# Patient Record
Sex: MALE | Race: BLACK OR AFRICAN AMERICAN | NOT HISPANIC OR LATINO | Employment: OTHER | URBAN - METROPOLITAN AREA
[De-identification: names, ages, dates, MRNs, and addresses within clinical notes are randomized per-mention and may not be internally consistent; named-entity substitution may affect disease eponyms.]

---

## 2017-02-21 ENCOUNTER — HOSPITAL ENCOUNTER (EMERGENCY)
Facility: HOSPITAL | Age: 47
Discharge: HOME | End: 2017-02-21
Attending: FAMILY MEDICINE
Payer: MEDICAID

## 2017-02-21 VITALS
BODY MASS INDEX: 35.42 KG/M2 | RESPIRATION RATE: 20 BRPM | SYSTOLIC BLOOD PRESSURE: 130 MMHG | TEMPERATURE: 98 F | WEIGHT: 300 LBS | HEART RATE: 63 BPM | DIASTOLIC BLOOD PRESSURE: 77 MMHG | OXYGEN SATURATION: 99 % | HEIGHT: 77 IN
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
## 2017-02-21 DIAGNOSIS — M23.92 INTERNAL DERANGEMENT OF KNEE, LEFT: Primary | ICD-10-CM

## 2017-02-21 PROCEDURE — 99283 EMERGENCY DEPT VISIT LOW MDM: CPT

## 2017-02-21 RX ORDER — TRAMADOL HYDROCHLORIDE 50 MG/1
50 TABLET ORAL EVERY 6 HOURS PRN
Qty: 20 TABLET | Refills: 0 | Status: SHIPPED | OUTPATIENT
Start: 2017-02-21 | End: 2017-03-03

## 2017-02-21 NOTE — ED PROVIDER NOTES
"Encounter Date: 2/21/2017       History     Chief Complaint   Patient presents with    Knee Pain     PT reports left knee pain. Pt reports he was stepping out of his truck and missed a step.     Review of patient's allergies indicates:  No Known Allergies  HPI Comments: Patient's 46-year-old black male comes in complaining of pain and tenderness to his left knee stemming from an injury this morning.  Patient was stepping out of his work truck on a rainy day when he slipped and twisted his knee and struck the anterior knee on the running board of the vehicle and on the ground.  He's had no locking or giving way.  He states the pain is more infrapatellar and anterior medial joint line.  The patient's been told by his orthopedist back in [place] that he had  "arthritis" .  Patient has recently moved to this area    The history is provided by the patient.     Past Medical History   Diagnosis Date    Arthritis      No past medical history pertinent negatives.  History reviewed. No pertinent past surgical history.  History reviewed. No pertinent family history.  Social History   Substance Use Topics    Smoking status: Never Smoker    Smokeless tobacco: None    Alcohol use No     Review of Systems   Constitutional: Negative for fever.   Respiratory: Negative for shortness of breath.    Musculoskeletal: Positive for arthralgias, gait problem and joint swelling. Negative for neck pain.   Neurological: Negative for weakness and numbness.   All other systems reviewed and are negative.      Physical Exam   Initial Vitals   BP Pulse Resp Temp SpO2   02/21/17 1358 02/21/17 1358 02/21/17 1358 02/21/17 1358 02/21/17 1358   136/79 68 20 98.4 °F (36.9 °C) 98 %     Physical Exam    Nursing note and vitals reviewed.  Constitutional: He appears well-developed and well-nourished.   Eyes: EOM are normal. Pupils are equal, round, and reactive to light.   Neck: Normal range of motion. Neck supple.   Cardiovascular: Normal rate and " regular rhythm.   Pulmonary/Chest: No respiratory distress.   Abdominal: Soft. There is no tenderness.   Musculoskeletal: He exhibits tenderness.   Patient lacks last 510° of full extension of the left knee.  Lachman and drawer test are negative.  No click on Chung test.  No medial or lateral instability when compared with the right knee.  He does complain of some tenderness on the anterior medial joint line   Neurological: He is alert and oriented to person, place, and time. He has normal strength. No cranial nerve deficit or sensory deficit.   Skin: Skin is warm. No erythema.   Psychiatric: He has a normal mood and affect.         ED Course   Procedures  Labs Reviewed - No data to display          Medical Decision Making:   Clinical Tests:   Radiological Study: Ordered and Reviewed  ED Management:  Plain films of the are negative.  Advised patient this does not rule out a meniscus or ligament injury although I cannot tell from his physical exam.  We'll apply Ace bandage.  Advised to use ice (gave him an ice bag).  And gave him a short prescription of tramadol to take along with Tylenol for pain relief.  Gave him the name of local orthopedist to make an appointment with as soon as possible                   ED Course     Clinical Impression:   The primary encounter diagnosis was Internal derangement of knee, left. A diagnosis of Fall was also pertinent to this visit.          ARABELLA Cr MD  02/21/17 9003

## 2017-02-21 NOTE — ED AVS SNAPSHOT
[facility]  [address] [place] [address]               Italia Frank   2017  1:57 PM   ED    Description:  Male : 1970   Department:  [facility]           Your Care was Coordinated By:     Provider Role From Javon Cr MD Attending Provider 17 [id number] --      Reason for Visit     Knee Pain           Diagnoses this Visit        Comments    Internal derangement of knee, left    -  Primary     Fall           ED Disposition     ED Disposition Condition Comment    Discharge             To Do List           Follow-up Information     Schedule an appointment as soon as possible for a visit with Cholo Whittaker MD.    Specialty:  Orthopedic Surgery    Contact information:    [address]  [address]  [place] [address]  [phone]         These Medications        Disp Refills Start End    tramadol (ULTRAM) 50 mg tablet 20 tablet 0 2017 3/3/2017    Take 1 tablet (50 mg total) by mouth every 6 (six) hours as needed for Pain. With a tylenol prn pain - Oral      [facility]     [facility] Care [facility] -  Assistance  Registered nurses in the [facility] provide clinical advisement, health education, appointment booking, and other advisory services.  Call for this free service at [phone].             Medications           Message regarding Medications     Verify the changes and/or additions to your medication regime listed below are the same as discussed with your clinician today.  If any of these changes or additions are incorrect, please notify your healthcare provider.        START taking these NEW medications        Refills    tramadol (ULTRAM) 50 mg tablet 0    Sig: Take 1 tablet (50 mg total) by mouth every 6 (six) hours as needed for Pain. With a tylenol prn pain    Class: Print    Route: Oral           Verify that the below list of medications is an accurate representation of  "the medications you are currently taking.  If none reported, the list may be blank. If incorrect, please contact your healthcare provider. Carry this list with you in case of emergency.           Current Medications     tramadol (ULTRAM) 50 mg tablet Take 1 tablet (50 mg total) by mouth every 6 (six) hours as needed for Pain. With a tylenol prn pain           Clinical Reference Information           Your Vitals Were     BP Pulse Temp Resp Height Weight    136/79 (BP Location: Right arm, Patient Position: Sitting) 68 98.4 °F (36.9 °C) (Oral) 20 6' 5" (1.956 m) 136.1 kg (300 lb)    SpO2 BMI             98% 35.57 kg/m2         Allergies as of 2/21/2017     No Known Allergies      Immunizations Administered on Date of Encounter - 2/21/2017     None      ED Micro, Lab, POCT     None      ED Imaging Orders     Start Ordered       Status Ordering Provider    02/21/17 1416 02/21/17 1415  X-Ray Knee 3 View Left  1 time imaging      Final result       Discharge References/Attachments     KNEE PAIN OF UNCERTAIN CAUSE (ENGLISH)    KNEE PAIN WITH POSSIBLE TORN MENISCUS (ENGLISH)      [facility] Sign-Up     Activating your [facility] account is as easy as 1-2-3!     1) Visit [email or web address], select Sign Up Now, enter this activation code and your date of birth, then select Next.  [email or web address]  Expires: 4/7/2017  3:06 PM      2) Create a username and password to use when you visit [facility] in the future and select a security question in case you lose your password and select Next.    3) Enter your e-mail address and click Sign Up!    Additional Information  If you have questions, please e-mail [email or web address] or call [phone] to talk to our [facility] staff. Remember, [facility] is NOT to be used for urgent needs. For medical emergencies, dial 911.          [facility] complies with applicable Federal civil rights laws and does not discriminate on the basis of race, color, national origin, age, " disability, or sex.        Language Assistance Services     ATTENTION: Language assistance services are available, free of charge. Please call [phone].      ATENCIÓN: Si habla akankshaañol, tiene a bowser disposición servicios gratuitos de asistencia lingüística. Llame al [phone].     CHÚ Ý: N?u b?n nói Ti?ng Vi?t, có các d?ch v? h? tr? ngôn ng? mi?n phí dành cho b?n. G?i s? [phone].

## 2017-07-08 ENCOUNTER — HOSPITAL ENCOUNTER (EMERGENCY)
Facility: HOSPITAL | Age: 47
Discharge: HOME | End: 2017-07-08
Attending: EMERGENCY MEDICINE
Payer: MEDICARE

## 2017-07-08 VITALS
DIASTOLIC BLOOD PRESSURE: 59 MMHG | TEMPERATURE: 99 F | SYSTOLIC BLOOD PRESSURE: 125 MMHG | HEIGHT: 77 IN | HEART RATE: 72 BPM | RESPIRATION RATE: 20 BRPM | BODY MASS INDEX: 35.42 KG/M2 | WEIGHT: 300 LBS | OXYGEN SATURATION: 98 %

## 2017-07-08 DIAGNOSIS — M54.9 BACK PAIN: ICD-10-CM

## 2017-07-08 DIAGNOSIS — M25.551 RIGHT HIP PAIN: ICD-10-CM

## 2017-07-08 DIAGNOSIS — M25.569 KNEE PAIN, ACUTE: ICD-10-CM

## 2017-07-08 PROCEDURE — 99284 EMERGENCY DEPT VISIT MOD MDM: CPT

## 2017-07-08 PROCEDURE — 25000003 PHARM REV CODE 250: Performed by: EMERGENCY MEDICINE

## 2017-07-08 RX ORDER — HYDROCODONE BITARTRATE AND ACETAMINOPHEN 5; 325 MG/1; MG/1
1 TABLET ORAL
Status: COMPLETED | OUTPATIENT
Start: 2017-07-08 | End: 2017-07-08

## 2017-07-08 RX ORDER — CYCLOBENZAPRINE HCL 10 MG
10 TABLET ORAL 3 TIMES DAILY PRN
Qty: 15 TABLET | Refills: 0 | Status: SHIPPED | OUTPATIENT
Start: 2017-07-08 | End: 2017-07-13

## 2017-07-08 RX ORDER — DOCUSATE SODIUM 100 MG/1
100 CAPSULE, LIQUID FILLED ORAL 2 TIMES DAILY PRN
Qty: 30 CAPSULE | Refills: 0 | Status: SHIPPED | OUTPATIENT
Start: 2017-07-08 | End: 2017-08-05

## 2017-07-08 RX ORDER — HYDROCODONE BITARTRATE AND ACETAMINOPHEN 5; 325 MG/1; MG/1
1 TABLET ORAL EVERY 4 HOURS PRN
Qty: 18 TABLET | Refills: 0 | Status: SHIPPED | OUTPATIENT
Start: 2017-07-08 | End: 2018-06-18

## 2017-07-08 RX ADMIN — HYDROCODONE BITARTRATE AND ACETAMINOPHEN 1 TABLET: 5; 325 TABLET ORAL at 10:07

## 2017-07-09 NOTE — ED PROVIDER NOTES
Encounter Date: 7/8/2017       History     Chief Complaint   Patient presents with    Fall     Slip and fall on ice, did not hit head, no LOC, c/o right lower back and left knee pain.  No other c/o.     Pt comes to the ED with right lower back and left knee pain after slipping on ice and falling just pta. No LOC           Review of patient's allergies indicates:  No Known Allergies  Past Medical History:   Diagnosis Date    Arthritis     Hypertension      Past Surgical History:   Procedure Laterality Date    HAND SURGERY       History reviewed. No pertinent family history.  Social History   Substance Use Topics    Smoking status: Never Smoker    Smokeless tobacco: Never Used    Alcohol use No     Review of Systems   Constitutional: Negative for fever.   HENT: Negative for sore throat.    Respiratory: Negative for shortness of breath.    Cardiovascular: Negative for chest pain.   Gastrointestinal: Negative for nausea.   Genitourinary: Negative for dysuria.   Musculoskeletal: Positive for arthralgias and back pain.   Skin: Negative for rash.   Neurological: Negative for weakness.   Hematological: Does not bruise/bleed easily.   All other systems reviewed and are negative.      Physical Exam     Initial Vitals [07/08/17 2157]   BP Pulse Resp Temp SpO2   (!) 125/59 72 20 98.9 °F (37.2 °C) 98 %      MAP       81         Physical Exam    Nursing note and vitals reviewed.  Constitutional: Vital signs are normal. He appears well-developed and well-nourished. No distress.   HENT:   Head: Normocephalic and atraumatic.   Eyes: EOM are normal. Pupils are equal, round, and reactive to light.   Neck: Normal range of motion. Neck supple.   Cardiovascular: Normal rate, regular rhythm and normal heart sounds.   Pulmonary/Chest: Breath sounds normal. No respiratory distress. He has no wheezes. He has no rhonchi. He has no rales. He exhibits no tenderness.   Abdominal: Soft. He exhibits no distension. There is no tenderness.  There is no rebound and no guarding.   Musculoskeletal: Normal range of motion. He exhibits no tenderness.   Neurological: He is alert and oriented to person, place, and time. No cranial nerve deficit.   Skin: Skin is warm and dry.   Psychiatric: He has a normal mood and affect.         ED Course   Procedures  Labs Reviewed - No data to display                            ED Course     Clinical Impression:   Diagnoses of Back pain, Knee pain, acute, and Right hip pain were pertinent to this visit.    Disposition:   Disposition: Discharged                        Saw Singer MD  07/19/17 4673

## 2017-07-09 NOTE — ED NOTES
Arrived via [place] from [facility] s/p fall with c/o RT lower back and coccyx pain and LT knee pain. Denies LOC

## 2017-08-05 ENCOUNTER — HOSPITAL ENCOUNTER (EMERGENCY)
Facility: HOSPITAL | Age: 47
Discharge: HOME | End: 2017-08-05
Attending: EMERGENCY MEDICINE
Payer: MEDICARE

## 2017-08-05 VITALS
OXYGEN SATURATION: 97 % | WEIGHT: 280 LBS | RESPIRATION RATE: 16 BRPM | HEART RATE: 70 BPM | BODY MASS INDEX: 33.06 KG/M2 | DIASTOLIC BLOOD PRESSURE: 69 MMHG | TEMPERATURE: 98 F | SYSTOLIC BLOOD PRESSURE: 141 MMHG | HEIGHT: 77 IN

## 2017-08-05 DIAGNOSIS — M19.032 ARTHRITIS OF LEFT WRIST: ICD-10-CM

## 2017-08-05 DIAGNOSIS — M54.50 LUMBOSACRAL PAIN, CHRONIC: Primary | ICD-10-CM
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
## 2017-08-05 DIAGNOSIS — G89.29 LUMBOSACRAL PAIN, CHRONIC: Primary | ICD-10-CM

## 2017-08-05 DIAGNOSIS — T14.90XA INJURY: ICD-10-CM

## 2017-08-05 PROCEDURE — 99283 EMERGENCY DEPT VISIT LOW MDM: CPT

## 2017-08-05 PROCEDURE — 63600175 PHARM REV CODE 636 W HCPCS: Performed by: EMERGENCY MEDICINE

## 2017-08-05 RX ORDER — PREDNISONE 10 MG/1
10 TABLET ORAL DAILY
Qty: 21 TABLET | Refills: 0 | Status: SHIPPED | OUTPATIENT
Start: 2017-08-05 | End: 2017-08-15

## 2017-08-05 RX ORDER — HYDROCODONE BITARTRATE AND ACETAMINOPHEN 7.5; 325 MG/1; MG/1
1 TABLET ORAL EVERY 6 HOURS PRN
Qty: 12 TABLET | Refills: 0 | Status: SHIPPED | OUTPATIENT
Start: 2017-08-05 | End: 2018-06-18

## 2017-08-05 RX ORDER — PREDNISONE 20 MG/1
60 TABLET ORAL
Status: COMPLETED | OUTPATIENT
Start: 2017-08-05 | End: 2017-08-05

## 2017-08-05 RX ADMIN — PREDNISONE 60 MG: 20 TABLET ORAL at 02:08

## 2017-08-05 NOTE — ED PROVIDER NOTES
Encounter Date: 8/5/2017       History     Chief Complaint   Patient presents with    Back Pain     Lower back pain since July 8- injured back in a fall at that time, states he is having continued lumbar back pain with radiation down legs.      Well-developed 47-year-old female.  No acute distress at this time are stable presently.  Complains of pain in the lumbar region as well as left wrist.  Patient reports in a fall on July 8.  Was seen by emergency room and evaluated in the time.  States that he is still in pain since that time.  Primarily in the low back.  Also has mild swelling of the soft tissues of the left wrist.  Comes in for further evaluation.  Denies any dysuria.  No difficulty walking.  No pain radiating down his legs bilateral.          Review of patient's allergies indicates:  No Known Allergies  Past Medical History:   Diagnosis Date    Arthritis     Hypertension      Past Surgical History:   Procedure Laterality Date    HAND SURGERY       History reviewed. No pertinent family history.  Social History   Substance Use Topics    Smoking status: Never Smoker    Smokeless tobacco: Never Used    Alcohol use No     Review of Systems   Constitutional: Negative.    Eyes: Negative.    Respiratory: Negative.    Cardiovascular: Negative.    Gastrointestinal: Negative.    Musculoskeletal: Positive for arthralgias ( Patient with pain in his left wrist secondary to fall) and back pain ( Chronic Back low back pain).   Skin: Negative.    All other systems reviewed and are negative.      Physical Exam     Initial Vitals [08/05/17 0157]   BP Pulse Resp Temp SpO2   (!) 149/92 80 18 98 °F (36.7 °C) 98 %      MAP       111         Physical Exam    Nursing note and vitals reviewed.  Constitutional: He appears well-developed and well-nourished.   HENT:   Head: Normocephalic.   Eyes: Pupils are equal, round, and reactive to light.   Neck: Normal range of motion. Neck supple.   Cardiovascular: Normal rate, regular  rhythm and normal heart sounds.   Pulmonary/Chest: Breath sounds normal.   Abdominal: Soft.   Musculoskeletal:   Tenderness to palpation lumbar region paraspinal.  Mild tenderness palpation left wrist on soft tissue mass.  Full range of motion.  No sciatica noted.  No midline tenderness to palpation of back.   Neurological: He is alert and oriented to person, place, and time. He has normal strength and normal reflexes.   Psychiatric: He has a normal mood and affect. His behavior is normal. Thought content normal.         ED Course   Procedures  Labs Reviewed - No data to display       X-Rays:   Independently Interpreted Readings:   Other Readings:  Lumbar spines and left wrist demonstrate no acute findings.  There is obvious DJD of the patient's L5-S1 and L4-L5 joint spaces.  There is also advanced arthritic changes of the patient's distal radius and in some of the carpals of the wrist.  All these appear to be chronic in nature.  No acute findings per my read.  Lumbar spine consistent with an x-ray performed previously.    Medical Decision Making:   Differential Diagnosis:   Kidney stone, muscle strain, epidural hematoma, epidural abscess, abscess superficial, fracture, facet dislocation, sciatica, radiculopathy, shingles, aortic aneurysm, renal artery aneurysm, contusion, trauma.    ED Management:  The patient stable at this time.  I will treat this patient with some steroid therapy, low-dose pain medicine.  Follow-up primary care physician as necessary.                   ED Course     Clinical Impression:   The primary encounter diagnosis was Lumbosacral pain, chronic. Diagnoses of Injury, Fall, and Arthritis of left wrist were also pertinent to this visit.    Disposition:   Disposition: Discharged  Condition: Stable                        Rock Mays MD  08/05/17 1212

## 2017-08-05 NOTE — DISCHARGE INSTRUCTIONS
Follow-up with your primary care physician for further evaluation of her chronic lumbar sacral pain as well as the left with arthritis.  Return to the emergency room with any concerns.

## 2017-12-18 ENCOUNTER — HOSPITAL ENCOUNTER (EMERGENCY)
Facility: HOSPITAL | Age: 47
Discharge: HOME | End: 2017-12-18
Attending: FAMILY MEDICINE
Payer: MEDICARE

## 2017-12-18 VITALS
HEART RATE: 57 BPM | TEMPERATURE: 97 F | OXYGEN SATURATION: 98 % | DIASTOLIC BLOOD PRESSURE: 68 MMHG | SYSTOLIC BLOOD PRESSURE: 146 MMHG

## 2017-12-18 DIAGNOSIS — R07.9 CHEST PAIN: ICD-10-CM

## 2017-12-18 DIAGNOSIS — Z91.018 FOOD ALLERGY: Primary | ICD-10-CM

## 2017-12-18 PROCEDURE — 93005 ELECTROCARDIOGRAM TRACING: CPT

## 2017-12-18 PROCEDURE — 93010 ELECTROCARDIOGRAM REPORT: CPT | Mod: ,,, | Performed by: INTERNAL MEDICINE

## 2017-12-18 PROCEDURE — 94760 N-INVAS EAR/PLS OXIMETRY 1: CPT

## 2017-12-18 PROCEDURE — 27000221 HC OXYGEN, UP TO 24 HOURS

## 2017-12-18 PROCEDURE — 99284 EMERGENCY DEPT VISIT MOD MDM: CPT | Mod: 25

## 2017-12-18 RX ORDER — EPINEPHRINE 0.3 MG/.3ML
1 INJECTION SUBCUTANEOUS
Qty: 2 DEVICE | Refills: 0 | Status: SHIPPED | OUTPATIENT
Start: 2017-12-18 | End: 2020-02-14

## 2017-12-18 RX ORDER — FAMOTIDINE 10 MG/ML
INJECTION INTRAVENOUS
Status: DISPENSED
Start: 2017-12-18 | End: 2017-12-19

## 2017-12-18 RX ORDER — METHYLPREDNISOLONE 4 MG/1
TABLET ORAL
Qty: 1 PACKAGE | Refills: 0 | Status: SHIPPED | OUTPATIENT
Start: 2017-12-18 | End: 2018-01-08

## 2017-12-18 RX ORDER — METHYLPREDNISOLONE SOD SUCC 125 MG
VIAL (EA) INJECTION
Status: DISPENSED
Start: 2017-12-18 | End: 2017-12-19

## 2017-12-18 RX ORDER — DIPHENHYDRAMINE HYDROCHLORIDE 50 MG/ML
INJECTION INTRAMUSCULAR; INTRAVENOUS
Status: DISPENSED
Start: 2017-12-18 | End: 2017-12-19

## 2017-12-18 RX ORDER — EPINEPHRINE 0.3 MG/.3ML
INJECTION SUBCUTANEOUS
Status: DISPENSED
Start: 2017-12-18 | End: 2017-12-19

## 2017-12-19 NOTE — ED PROVIDER NOTES
Encounter Date: 12/18/2017       History     Chief Complaint   Patient presents with    Allergic Reaction     Patient says he is ALLERGIC to shrimp and he ate shrimp about an hour ago.  Patient then started having itching on body and tightening in his throat.  Patient came immediately to the ER.  There is no lip or tongue swelling.  Patient is satting 100% on room air but feels like his throat is closing.  There is normal.  Inhalation and exhalation.  No wheezing.      The history is provided by the patient.     Review of patient's allergies indicates:   Allergen Reactions    Shrimp Anaphylaxis     Past Medical History:   Diagnosis Date    Arthritis     Hypertension      Past Surgical History:   Procedure Laterality Date    HAND SURGERY       No family history on file.  Social History   Substance Use Topics    Smoking status: Never Smoker    Smokeless tobacco: Never Used    Alcohol use No     Review of Systems   Constitutional: Negative for activity change, appetite change, chills and fever.   HENT: Negative for congestion, ear discharge, ear pain, rhinorrhea, sinus pressure and sore throat.    Eyes: Negative for pain, discharge, redness, itching and visual disturbance.   Respiratory: Positive for choking, chest tightness and shortness of breath. Negative for cough, wheezing and stridor.    Cardiovascular: Negative for chest pain and palpitations.   Gastrointestinal: Negative for abdominal distention, abdominal pain, diarrhea and vomiting.   Genitourinary: Negative for dysuria, flank pain and frequency.   Musculoskeletal: Negative for back pain, gait problem, neck pain and neck stiffness.   Skin: Negative for rash.   Neurological: Negative for dizziness, seizures, syncope, weakness, light-headedness, numbness and headaches.   Psychiatric/Behavioral: Negative for confusion. The patient is nervous/anxious.    All other systems reviewed and are negative.      Physical Exam     Initial Vitals   BP Pulse Resp  Temp SpO2   -- -- -- -- --      MAP       --         Physical Exam    Nursing note and vitals reviewed.  Constitutional: He appears well-developed and well-nourished.   HENT:   Head: Normocephalic.   Right Ear: External ear normal.   Left Ear: External ear normal.   Nose: Nose normal.   Mouth/Throat: Oropharynx is clear and moist.   Eyes: Conjunctivae and EOM are normal. Pupils are equal, round, and reactive to light.   Neck: Normal range of motion.   Cardiovascular: Normal rate, regular rhythm, normal heart sounds and intact distal pulses. Exam reveals no friction rub.    No murmur heard.  Pulmonary/Chest: He is in respiratory distress. He has no wheezes. He has no rhonchi. He has no rales. He exhibits no tenderness.   Abdominal: Soft. Bowel sounds are normal. He exhibits no distension. There is no tenderness. There is no rebound and no guarding.   Musculoskeletal: Normal range of motion.   Neurological: He is alert and oriented to person, place, and time. He has normal strength and normal reflexes. He displays normal reflexes. No cranial nerve deficit or sensory deficit.   Skin: Skin is warm. Capillary refill takes less than 2 seconds. No rash noted. No erythema.         ED Course   Procedures  Labs Reviewed - No data to display          Medical Decision Making:   Initial Assessment:   Patient presented to ER after eating shrimp which she is ALLERGIC to.  Complains of swelling sensation in his throat.  On arrival to the ED his oxygen saturations are 100% and his airway is protected but patient seems to be very anxious.  Fumarase later hysterectomy brevis throat.  At this time patient is immediately injected with epinephrine.  Differential Diagnosis:   Panic attack, seafood ALLERGY, sore throat, respiratory distress,  ED Management:  Patient is immediately treated with EpiPen and also with Solu-Medrol, Pepcid and Benadryl.  Patient symptoms improved.  His airway was not compromised and his lungs were clear without  any wheezing.  His oral cavity on throat did not show any obvious visible angioedema.  It was all subjective by the patient which improved after medications.  Patient is given a prescription for EpiPen and also Medrol Dosepak.  Patient is advised to follow-up with his primary care physician and get ALLERGY immunology referral.slowl slipped and                   ED Course      Clinical Impression:   The primary encounter diagnosis was Food allergy. A diagnosis of Chest pain was also pertinent to this visit.    Disposition:   Disposition: Discharged  Condition: Zurdo Reese MD  12/19/17 0459

## 2017-12-31 ENCOUNTER — HOSPITAL ENCOUNTER (EMERGENCY)
Facility: HOSPITAL | Age: 47
Discharge: HOME | End: 2017-12-31
Attending: EMERGENCY MEDICINE
Payer: MEDICARE

## 2017-12-31 VITALS
BODY MASS INDEX: 35.42 KG/M2 | TEMPERATURE: 98 F | OXYGEN SATURATION: 98 % | HEART RATE: 68 BPM | WEIGHT: 300 LBS | RESPIRATION RATE: 19 BRPM | SYSTOLIC BLOOD PRESSURE: 154 MMHG | HEIGHT: 77 IN | DIASTOLIC BLOOD PRESSURE: 84 MMHG

## 2017-12-31 DIAGNOSIS — I10 ESSENTIAL HYPERTENSION: Primary | ICD-10-CM

## 2017-12-31 PROCEDURE — 99282 EMERGENCY DEPT VISIT SF MDM: CPT

## 2017-12-31 RX ORDER — LISINOPRIL 10 MG/1
10 TABLET ORAL DAILY
Qty: 60 TABLET | Refills: 0 | Status: SHIPPED | OUTPATIENT
Start: 2017-12-31 | End: 2020-02-14

## 2017-12-31 NOTE — ED PROVIDER NOTES
Encounter Date: 12/31/2017       History     Chief Complaint   Patient presents with    Medication Refill     pt reports being out of his b/p medication (lisinopril 10-20 mg) for approx 3 months     Pt off lisinopril for 3 months. Here for refill. He has no local doctor. No CP, SOB or HA. Feels well.           Review of patient's allergies indicates:   Allergen Reactions    Shrimp Anaphylaxis     Past Medical History:   Diagnosis Date    Arthritis     Hypertension      Past Surgical History:   Procedure Laterality Date    HAND SURGERY       History reviewed. No pertinent family history.  Social History   Substance Use Topics    Smoking status: Current Every Day Smoker     Types: Cigarettes    Smokeless tobacco: Never Used    Alcohol use No     Review of Systems   Constitutional: Negative for fatigue and fever.   HENT: Negative for sore throat.    Respiratory: Negative for chest tightness and shortness of breath.    Cardiovascular: Negative for chest pain and leg swelling.   Gastrointestinal: Negative for diarrhea and nausea.   Genitourinary: Negative for dysuria.   Musculoskeletal: Negative for back pain and neck pain.   Skin: Negative for rash.   Neurological: Negative for weakness, light-headedness and headaches.   Hematological: Does not bruise/bleed easily.   All other systems reviewed and are negative.      Physical Exam     Initial Vitals [12/31/17 0117]   BP Pulse Resp Temp SpO2   (!) 158/101 71 18 98.4 °F (36.9 °C) 98 %      MAP       120         Physical Exam    Nursing note and vitals reviewed.  Constitutional: Vital signs are normal. He appears well-developed and well-nourished. No distress.   HENT:   Head: Normocephalic and atraumatic.   Eyes: EOM are normal. Pupils are equal, round, and reactive to light.   Neck: Normal range of motion. Neck supple.   Cardiovascular: Normal rate, regular rhythm and normal heart sounds. Exam reveals no gallop and no friction rub.    No murmur  heard.  Pulmonary/Chest: Breath sounds normal. No respiratory distress. He has no wheezes. He has no rhonchi. He has no rales. He exhibits no tenderness.   Abdominal: Soft. He exhibits no distension. There is no tenderness. There is no rebound and no guarding.   Musculoskeletal: Normal range of motion. He exhibits no tenderness.   Neurological: He is alert and oriented to person, place, and time. No cranial nerve deficit.   Skin: Skin is warm and dry.   Psychiatric: He has a normal mood and affect.         ED Course   Procedures  Labs Reviewed - No data to display                            ED Course      Clinical Impression:   The encounter diagnosis was Essential hypertension.    Disposition:   Disposition: Discharged  Condition: Stable                        Saw Singer MD  12/31/17 0643

## 2018-06-07 ENCOUNTER — TELEPHONE (OUTPATIENT)
Dept: OPHTHALMOLOGY | Facility: CLINIC | Age: 48
End: 2018-06-07

## 2018-06-07 NOTE — TELEPHONE ENCOUNTER
06/07/18 Silvia called Mr. Frank and he has been schedule to see Dr. Mcconnell on Tuesday 06/12/18 per referral by Dr. Floyd. We have notes. stj         ----- Message from Maria D Gifford sent at 6/7/2018 10:37 AM CDT -----  Contact: Anika(Dr. Newell)  Anika call from Dr. Sanders office to schedule muscle balance surgery. She is faxing the referral over. She can be reached at [phone] to schedule.

## 2018-07-24 ENCOUNTER — TELEPHONE (OUTPATIENT)
Dept: OPHTHALMOLOGY | Facility: CLINIC | Age: 48
End: 2018-07-24

## 2018-07-24 NOTE — TELEPHONE ENCOUNTER
"This is for documentation purposes:     PT Mr. Italia Frank was referred to our clinic for  "Blunt Trauma causing Diplopia and R/O Entrapment/Dr. Mariel MD".  PT has been scheduled 4 times and as rescheduled 3 x and no-showed 1 x. Dates are as follow: 06/12/18 initial referral, 06/21/18, 07/05/18, Cancel and r/s and 07/24/18 (NS). stj   "

## 2018-08-03 ENCOUNTER — TELEPHONE (OUTPATIENT)
Dept: OPHTHALMOLOGY | Facility: CLINIC | Age: 48
End: 2018-08-03

## 2018-08-03 NOTE — TELEPHONE ENCOUNTER
08/03/18  Silvia made f/u call to pt ( Mr. Italia Frank) and spoke with wife regarding last 4 missed appt. I stressed the urgency of the pt to be seen by Dr. Mcconnell regarding the diplopia and possible entrapment of his eye due to injuries. She will have him call to schedule at a time that is better for him. [facility]  11:35a

## 2019-08-26 PROBLEM — R20.0 NUMBNESS AND TINGLING: Status: ACTIVE | Noted: 2019-08-26

## 2019-08-26 PROBLEM — M51.36 DEGENERATIVE DISC DISEASE, LUMBAR: Status: ACTIVE | Noted: 2019-08-26

## 2019-08-26 PROBLEM — M79.604 LOW BACK PAIN RADIATING TO RIGHT LEG: Status: ACTIVE | Noted: 2019-08-26

## 2019-08-26 PROBLEM — M51.369 DEGENERATIVE DISC DISEASE, LUMBAR: Status: ACTIVE | Noted: 2019-08-26

## 2019-08-26 PROBLEM — R20.2 NUMBNESS AND TINGLING: Status: ACTIVE | Noted: 2019-08-26

## 2019-08-26 PROBLEM — M54.50 LOW BACK PAIN RADIATING TO RIGHT LEG: Status: ACTIVE | Noted: 2019-08-26

## 2021-07-15 PROBLEM — R06.02 SHORTNESS OF BREATH: Status: ACTIVE | Noted: 2021-07-15

## 2021-07-15 PROBLEM — Z91.030 BEE STING ALLERGY: Status: ACTIVE | Noted: 2021-07-15

## 2022-06-12 PROBLEM — R10.30 LOWER ABDOMINAL PAIN: Status: ACTIVE | Noted: 2022-06-12

## 2023-02-06 ENCOUNTER — OFFICE VISIT (OUTPATIENT)
Dept: WOUND CARE | Facility: HOSPITAL | Age: 53
End: 2023-02-06
Attending: SURGERY
Payer: COMMERCIAL

## 2023-02-06 VITALS
RESPIRATION RATE: 18 BRPM | DIASTOLIC BLOOD PRESSURE: 63 MMHG | TEMPERATURE: 98 F | HEART RATE: 78 BPM | SYSTOLIC BLOOD PRESSURE: 129 MMHG

## 2023-02-06 DIAGNOSIS — T81.31XA POSTOPERATIVE WOUND DEHISCENCE, INITIAL ENCOUNTER: ICD-10-CM

## 2023-02-06 PROCEDURE — 3074F PR MOST RECENT SYSTOLIC BLOOD PRESSURE < 130 MM HG: ICD-10-PCS | Mod: CPTII,,, | Performed by: SURGERY

## 2023-02-06 PROCEDURE — 99499 UNLISTED E&M SERVICE: CPT | Mod: ,,, | Performed by: SURGERY

## 2023-02-06 PROCEDURE — 99499 NO LOS: ICD-10-PCS | Mod: ,,, | Performed by: SURGERY

## 2023-02-06 PROCEDURE — 1159F MED LIST DOCD IN RCRD: CPT | Mod: CPTII,,, | Performed by: SURGERY

## 2023-02-06 PROCEDURE — 1159F PR MEDICATION LIST DOCUMENTED IN MEDICAL RECORD: ICD-10-PCS | Mod: CPTII,,, | Performed by: SURGERY

## 2023-02-06 PROCEDURE — 3074F SYST BP LT 130 MM HG: CPT | Mod: CPTII,,, | Performed by: SURGERY

## 2023-02-06 PROCEDURE — 17250 CHEM CAUT OF GRANLTJ TISSUE: CPT

## 2023-02-06 PROCEDURE — 99214 OFFICE O/P EST MOD 30 MIN: CPT

## 2023-02-06 PROCEDURE — 3078F PR MOST RECENT DIASTOLIC BLOOD PRESSURE < 80 MM HG: ICD-10-PCS | Mod: CPTII,,, | Performed by: SURGERY

## 2023-02-06 PROCEDURE — 3078F DIAST BP <80 MM HG: CPT | Mod: CPTII,,, | Performed by: SURGERY

## 2023-02-06 NOTE — ASSESSMENT & PLAN NOTE
There is a hypertrophic scar in the lower midline of the back.  At the superior aspect of the scar there is a small opening.  There does not appear to be any significant drainage or tracking.  It does not appear to be any infection.  Area was cauterized with silver nitrate.  Will apply silver.  If drainage persists will obtain further imaging to rule out underlying fluid collection or abscess

## 2023-02-06 NOTE — PROGRESS NOTES
[facility]  Wound Care  Progress Note    Problem List Items Addressed This Visit       Wound dehiscence, surgical    Overview     52-year-old male with long history of back problems.  Patient had a fusion several years ago.  Recently patient presented in September 2022 with cauda equina syndrome requiring urgent surgery.  At that time he had L2-3 decompression with lumbar fusion L2-3 for spondylolisthesis.  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  Redo fusion was recommended.  Patient underwent exploration 12/20/2022 for the failed fusion.  Removal of the hardware was performed at that time.  Patient has been having small drainage from the wound since the surgery.  Patient states that he is just been covering the area with gauze.  Patient denies any fever or chills.  Patient has had no further imaging since the surgery.         Current Assessment & Plan     There is a hypertrophic scar in the lower midline of the back.  At the superior aspect of the scar there is a small opening.  There does not appear to be any significant drainage or tracking.  It does not appear to be any infection.  Area was cauterized with silver nitrate.  Will apply silver.  If drainage persists will obtain further imaging to rule out underlying fluid collection or abscess            See wound doc progress notes. Documents will be scanned.        Payam Kaur  [facility]  History and Physical    Problem List Items Addressed This Visit       Wound dehiscence, surgical    Overview     52-year-old male with long history of back problems.  Patient had a fusion several years ago.  Recently patient presented in September 2022 with cauda equina syndrome requiring urgent surgery.  At that time he had L2-3 decompression with lumbar fusion L2-3 for spondylolisthesis.  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  Redo fusion was recommended.  Patient underwent exploration 12/20/2022 for the failed fusion.  Removal of the hardware was performed at that time.  Patient has been having small drainage from the wound since the surgery.  Patient states that he is just been covering the area with gauze.  Patient denies any fever or chills.  Patient has had no further imaging since the surgery.         Current Assessment & Plan     There is a hypertrophic scar in the lower midline of the back.  At the superior aspect of the scar there is a small opening.  There does not appear to be any significant drainage or tracking.  It does not appear to be any infection.  Area was cauterized with silver nitrate.  Will apply silver.  If drainage persists will obtain further imaging to rule out underlying fluid collection or abscess              History:    Past Medical History:   Diagnosis Date    Arthritis     Hypertension        Past Surgical History:   Procedure Laterality Date    HAND SURGERY      KNEE CARTILAGE SURGERY      SPINAL FUSION N/A 03/05/2021       No family history on file.     reports that he has quit smoking. His smoking use included vaping with nicotine. He has never used smokeless tobacco. He reports that he does not drink alcohol and does not use drugs.    has a current medication list which includes the following prescription(s): albuterol, belladonna alkaloids-opium 16.2-30 mg, epinephrine, famotidine, hydrocodone-acetaminophen, hyoscyamine, levocetirizine, lidocaine, polyethylene glycol, testosterone cypionate, and valsartan.    Allergies:  Allergen ext-venom-honey bee, Shrimp, Naproxen, and Tramadol    Review of Systems:  ROS      There were no vitals filed for this visit.      BMI:  There is no height or weight on file to calculate BMI.    Physical Exam:  Physical Exam  Well-developed well-nourished   American male sitting up in bed.  Patient in no distress.    Patient with lower midline hypertrophic scar at previous surgical site.  There is no sign of soft tissue infection.  There is no concerning tenderness erythema.  There is a small opening at the superior aspect of the wound which was probed with a Q-tip.  There is no significant trach.  No drainage at this time.  Area cauterized with silver nitrate  A1C:  No results for input(s): HGBA1C in the last 2160 hours.  BMP:  No results for input(s): GLU, NA, K, CL, CO2, BUN, CREATININE, CALCIUM, MG in the last 2160 hours.   CBC:  No results for input(s): WBC, RBC, HGB, HCT, PLT, MCV, MCH, MCHC in the last 2160 hours.  CMP:  No results for input(s): GLU, CALCIUM, ALBUMIN, PROT, NA, K, CO2, CL, BUN, ALKPHOS, ALT, AST, BILITOT in the last 2160 hours.    Invalid input(s): CREATININ  PREALBUMIN:  No results for input(s): PREALBUMIN in the last 2160 hours.  WOUND CULTURES:  No results for input(s): LABAERO in the last 2160 hours.        Plan:  See Wound Docs note for plan and follow up.        Payam OLEA Marino [facility]

## 2023-04-04 ENCOUNTER — PATIENT MESSAGE (OUTPATIENT)
Dept: RESEARCH | Facility: HOSPITAL | Age: 53
End: 2023-04-04
Payer: COMMERCIAL